# Patient Record
Sex: MALE | Race: WHITE | NOT HISPANIC OR LATINO | URBAN - NONMETROPOLITAN AREA
[De-identification: names, ages, dates, MRNs, and addresses within clinical notes are randomized per-mention and may not be internally consistent; named-entity substitution may affect disease eponyms.]

---

## 2023-12-21 ENCOUNTER — OFFICE VISIT (OUTPATIENT)
Dept: FAMILY MEDICINE CLINIC | Facility: CLINIC | Age: 38
End: 2023-12-21
Payer: MEDICAID

## 2023-12-21 VITALS
OXYGEN SATURATION: 98 % | BODY MASS INDEX: 36.58 KG/M2 | RESPIRATION RATE: 18 BRPM | DIASTOLIC BLOOD PRESSURE: 84 MMHG | HEIGHT: 73 IN | HEART RATE: 78 BPM | SYSTOLIC BLOOD PRESSURE: 122 MMHG | WEIGHT: 276 LBS

## 2023-12-21 DIAGNOSIS — F32.A ANXIETY AND DEPRESSION: ICD-10-CM

## 2023-12-21 DIAGNOSIS — Z76.89 ENCOUNTER TO ESTABLISH CARE: Primary | ICD-10-CM

## 2023-12-21 DIAGNOSIS — Z00.00 ENCOUNTER FOR ANNUAL PHYSICAL EXAM: ICD-10-CM

## 2023-12-21 DIAGNOSIS — F41.9 ANXIETY AND DEPRESSION: ICD-10-CM

## 2023-12-21 DIAGNOSIS — F90.9 ATTENTION DEFICIT HYPERACTIVITY DISORDER (ADHD), UNSPECIFIED ADHD TYPE: ICD-10-CM

## 2023-12-21 PROBLEM — F90.0 ATTENTION DEFICIT HYPERACTIVITY DISORDER (ADHD), PREDOMINANTLY INATTENTIVE TYPE: Status: ACTIVE | Noted: 2023-12-21

## 2023-12-21 LAB
BILIRUB BLD-MCNC: ABNORMAL MG/DL
CLARITY, POC: CLEAR
COLOR UR: YELLOW
GLUCOSE UR STRIP-MCNC: NEGATIVE MG/DL
KETONES UR QL: NEGATIVE
LEUKOCYTE EST, POC: NEGATIVE
NITRITE UR-MCNC: NEGATIVE MG/ML
PH UR: 6 [PH] (ref 5–8)
PROT UR STRIP-MCNC: NEGATIVE MG/DL
RBC # UR STRIP: NEGATIVE /UL
SP GR UR: 1.03 (ref 1–1.03)
UROBILINOGEN UR QL: NORMAL

## 2023-12-21 RX ORDER — DEXTROAMPHETAMINE SACCHARATE, AMPHETAMINE ASPARTATE, DEXTROAMPHETAMINE SULFATE AND AMPHETAMINE SULFATE 5; 5; 5; 5 MG/1; MG/1; MG/1; MG/1
20 TABLET ORAL DAILY
COMMUNITY

## 2023-12-21 RX ORDER — ESCITALOPRAM OXALATE 10 MG/1
10 TABLET ORAL
Qty: 30 TABLET | Refills: 0 | Status: SHIPPED | OUTPATIENT
Start: 2023-12-21 | End: 2024-01-20

## 2023-12-21 NOTE — PROGRESS NOTES
"Chief Complaint  Establish Care (Continuing care of ADHD/Requesting adderall./)    Subjective        Nemesio Mcdonough presents to [facility]  C/O ESTABLISH CARE AS HIS PCP 2. FASTING LABS 3. CHRONIC ILLNESS  Anxiety  Presents for initial visit. Onset was more than 5 years ago. The problem has been gradually worsening. Patient reports no decreased concentration, depressed mood, excessive worry, impotence, insomnia, irritability, nervous/anxious behavior, panic or suicidal ideas. Symptoms occur most days. The severity of symptoms is moderate. The patient sleeps 5 hours per night. The quality of sleep is fair. Nighttime awakenings: one to two.     His past medical history is significant for depression. Past treatments include lifestyle changes and non-SSRI antidepressants. The treatment provided no relief. Compliance with prior treatments has been poor.   Depression  Visit Type: follow-up  Patient is not experiencing: decreased concentration, depressed mood, excessive worry, feelings of hopelessness, impotence, insomnia, irritability, nervousness/anxiety, panic and suicidal ideas.   Severity: moderate   Sleep per night: 5 hours  Sleep quality: fair  Nighttime awakenings: one to two  Compliance with medications:  %      ADHD:  HE WAS DIAGNOSED IN CHILDHOOD/ HE REPORTS HE HAS HAD DIFFICULTY FOCUS   AND HIS JOB IS BEING AFFECTED/ HE SOMETIMES HAS A HARD TIME STAYING ON TASK/   HE STOPPED MEDS 1 YEAR . HE RECENTLY MOVED IN THE AREA.        Objective   Vital Signs:  /84   Pulse 78   Resp 18   Ht 185.4 cm (73\")   Wt 125 kg (276 lb)   SpO2 98%   BMI 36.41 kg/m²   Estimated body mass index is 36.41 kg/m² as calculated from the following:    Height as of this encounter: 185.4 cm (73\").    Weight as of this encounter: 125 kg (276 lb).            Physical Exam  Vitals and nursing note reviewed. Exam conducted with a chaperone present.   Constitutional:       General: He is awake.      " Appearance: Normal appearance. He is well-developed, well-groomed and normal weight. He is obese.   HENT:      Head: Normocephalic and atraumatic.      Jaw: There is normal jaw occlusion.      Right Ear: Hearing, tympanic membrane, ear canal and external ear normal.      Left Ear: Hearing, tympanic membrane, ear canal and external ear normal.      Nose: Nose normal.      Mouth/Throat:      Lips: Pink.      Mouth: Mucous membranes are moist.      Pharynx: Oropharynx is clear.   Eyes:      General: Lids are normal. Vision grossly intact. Gaze aligned appropriately.      Extraocular Movements: Extraocular movements intact.      Conjunctiva/sclera: Conjunctivae normal.      Pupils: Pupils are equal, round, and reactive to light.   Neck:      Trachea: Trachea and phonation normal.   Cardiovascular:      Rate and Rhythm: Normal rate and regular rhythm.      Pulses: Normal pulses.      Heart sounds: Normal heart sounds.   Pulmonary:      Effort: Pulmonary effort is normal.      Breath sounds: Normal breath sounds and air entry.   Abdominal:      General: Abdomen is protuberant. Bowel sounds are normal.      Palpations: Abdomen is soft.   Genitourinary:     Rectum: Normal.   Musculoskeletal:         General: Normal range of motion.      Right shoulder: Normal.      Left shoulder: Normal.      Right upper arm: Normal.      Left upper arm: Normal.      Right elbow: Normal.      Left elbow: Normal.      Right forearm: Normal.      Left forearm: Normal.      Right wrist: Normal.      Left wrist: Normal.      Right hand: Normal.      Left hand: Normal.      Cervical back: Normal, full passive range of motion without pain, normal range of motion and neck supple.      Thoracic back: Normal.      Lumbar back: Normal.      Right hip: Normal.      Left hip: Normal.      Right upper leg: Normal.      Left upper leg: Normal.      Right knee: Normal.      Left knee: Normal.      Right lower leg: Normal. No edema.      Left lower leg:  Normal. No edema.      Right ankle: Normal.      Right Achilles Tendon: Normal.      Left ankle: Normal.      Left Achilles Tendon: Normal.      Right foot: Normal.      Left foot: Normal.   Lymphadenopathy:      Cervical: No cervical adenopathy.   Skin:     General: Skin is warm.      Capillary Refill: Capillary refill takes less than 2 seconds.   Neurological:      General: No focal deficit present.      Mental Status: He is alert and oriented to person, place, and time.      Cranial Nerves: Cranial nerves 2-12 are intact.      Sensory: Sensation is intact.      Motor: Motor function is intact.      Coordination: Coordination is intact.      Gait: Gait is intact.      Deep Tendon Reflexes: Reflexes are normal and symmetric.   Psychiatric:         Attention and Perception: Attention and perception normal.         Mood and Affect: Mood and affect normal.         Speech: Speech normal.         Behavior: Behavior normal. Behavior is cooperative.         Thought Content: Thought content normal.         Cognition and Memory: Cognition and memory normal.         Judgment: Judgment normal.        Result Review :  The following data was reviewed by: Raúl Kelley MD on 12/21/2023:  LABS STILL PENDING          Assessment and Plan   Diagnoses and all orders for this visit:    1. Encounter to establish care (Primary)    2. Encounter for annual physical exam  -     CBC & Differential; Future  -     Comprehensive Metabolic Panel; Future  -     Hemoglobin A1c; Future  -     TSH; Future  -     Lipid Panel; Future  -     Hepatitis C Antibody; Future  -     Vitamin D,25-Hydroxy; Future  -     Vitamin B12; Future  -     POC Urinalysis Dipstick    3. Attention deficit hyperactivity disorder (ADHD), unspecified ADHD type  Comments:  WURS QUESTIONNAIRE DONE  Orders:  -     Ambulatory Referral to Psychiatry  -     CBC & Differential; Future  -     Comprehensive Metabolic Panel; Future  -     Hemoglobin A1c; Future  -     TSH;  Future  -     Lipid Panel; Future  -     Hepatitis C Antibody; Future  -     Vitamin D,25-Hydroxy; Future  -     Vitamin B12; Future  -     POC Urinalysis Dipstick    4. Anxiety and depression  -     Ambulatory Referral to Psychiatry  -     CBC & Differential; Future  -     Comprehensive Metabolic Panel; Future  -     Hemoglobin A1c; Future  -     TSH; Future  -     Lipid Panel; Future  -     Hepatitis C Antibody; Future  -     Vitamin D,25-Hydroxy; Future  -     Vitamin B12; Future  -     POC Urinalysis Dipstick    Other orders  -     escitalopram (Lexapro) 10 MG tablet; Take 1 tablet by mouth every night at bedtime for 30 days.  Dispense: 30 tablet; Refill: 0           I spent 30 minutes caring for Nemesio on this date of service. This time includes time spent by me in the following activities:reviewing tests, obtaining and/or reviewing a separately obtained history, performing a medically appropriate examination and/or evaluation , counseling and educating the patient/family/caregiver, ordering medications, tests, or procedures, and documenting information in the medical record     The preventive exam has been reviewed in detail.  The patient has been fully counseled on preventative guidelines for vaccines, cancer screenings, and other health maintenance needs.   The patient has been counseled on guidelines for maintaining a lifestyle to promote good health and to minimize chronic diseases.  The patient has been assisted with scheduling these healthcare procedures for the coming year and given a written document of health maintenance and anticipatory guidance for age with the AVS.       Follow Up   Return in about 2 weeks (around 1/4/2024) for Recheck-- NEW MED FOR ANXIETY/DEP.  Patient was given instructions and counseling regarding his condition or for health maintenance advice. Please see specific information pulled into the AVS if appropriate.           This document has been electronically signed by  Raúl Kelley MD  December 22, 2023 09:50 EST

## 2024-01-26 ENCOUNTER — OFFICE VISIT (OUTPATIENT)
Dept: FAMILY MEDICINE CLINIC | Facility: CLINIC | Age: 39
End: 2024-01-26
Payer: MEDICAID

## 2024-01-26 VITALS
SYSTOLIC BLOOD PRESSURE: 134 MMHG | HEART RATE: 69 BPM | TEMPERATURE: 98 F | RESPIRATION RATE: 18 BRPM | DIASTOLIC BLOOD PRESSURE: 82 MMHG | OXYGEN SATURATION: 100 % | WEIGHT: 281 LBS | HEIGHT: 73 IN | BODY MASS INDEX: 37.24 KG/M2

## 2024-01-26 DIAGNOSIS — E66.9 OBESITY (BMI 30-39.9): Primary | ICD-10-CM

## 2024-01-26 DIAGNOSIS — F90.0 ATTENTION DEFICIT HYPERACTIVITY DISORDER (ADHD), PREDOMINANTLY INATTENTIVE TYPE: ICD-10-CM

## 2024-01-26 PROCEDURE — 99213 OFFICE O/P EST LOW 20 MIN: CPT | Performed by: NURSE PRACTITIONER

## 2024-01-26 PROCEDURE — 1159F MED LIST DOCD IN RCRD: CPT | Performed by: NURSE PRACTITIONER

## 2024-01-26 PROCEDURE — 1160F RVW MEDS BY RX/DR IN RCRD: CPT | Performed by: NURSE PRACTITIONER

## 2024-01-26 NOTE — PROGRESS NOTES
"Chief Complaint  Follow-up (Follow up on ADHD. Wants to discuss weight-loss options.)    Subjective        Nemesio Mcdonough presents to [facility] for follow up (weight loss).    Other  Chronicity: \"Diagnosed years ago; middle school\" with ADHD. Episode onset: \"years ago\" The problem has been gradually worsening. Pertinent negatives include no abdominal pain, anorexia, arthralgias, change in bowel habit, chest pain, chills, congestion, coughing, diaphoresis, fatigue, fever, headaches, joint swelling, myalgias, nausea, neck pain, numbness, rash, sore throat, swollen glands, urinary symptoms, vertigo, visual change, vomiting or weakness. Treatments tried: Adderall; Was being prescribed by provider in [place]. Improvement on treatment: Out of medication for approximatley a year and a half.   Obesity  This is a chronic problem. The current episode started more than 1 year ago. The problem has been gradually worsening. Pertinent negatives include no abdominal pain, anorexia, arthralgias, change in bowel habit, chest pain, chills, congestion, coughing, diaphoresis, fatigue, fever, headaches, joint swelling, myalgias, nausea, neck pain, numbness, rash, sore throat, swollen glands, urinary symptoms, vertigo, visual change, vomiting or weakness. The symptoms are aggravated by stress. Treatments tried: diet; exercise; calorie restriction.     Objective   Vital Signs:  /82   Pulse 69   Temp 98 °F (36.7 °C) (Temporal)   Resp 18   Ht 185.4 cm (73\")   Wt 127 kg (281 lb)   SpO2 100%   BMI 37.07 kg/m²   Estimated body mass index is 37.07 kg/m² as calculated from the following:    Height as of this encounter: 185.4 cm (73\").    Weight as of this encounter: 127 kg (281 lb).       Physical Exam  Vitals and nursing note reviewed.   Constitutional:       General: He is awake.      Appearance: Normal appearance. He is obese.   HENT:      Head: Normocephalic.      Right Ear: Hearing and external ear " normal.      Left Ear: Hearing and external ear normal.      Nose: Nose normal.      Mouth/Throat:      Lips: Pink.      Mouth: Mucous membranes are moist.   Eyes:      General: Lids are normal.      Conjunctiva/sclera: Conjunctivae normal.      Pupils: Pupils are equal, round, and reactive to light.   Cardiovascular:      Rate and Rhythm: Normal rate.   Pulmonary:      Effort: Pulmonary effort is normal.   Abdominal:      General: Abdomen is protuberant.   Musculoskeletal:         General: Normal range of motion.      Cervical back: Normal range of motion.   Skin:     General: Skin is warm and dry.      Capillary Refill: Capillary refill takes less than 2 seconds.   Neurological:      Mental Status: He is alert and oriented to person, place, and time.      Sensory: Sensation is intact.      Motor: Motor function is intact.      Coordination: Coordination is intact.      Gait: Gait is intact.   Psychiatric:         Attention and Perception: Attention and perception normal.         Mood and Affect: Affect normal. Mood is anxious.         Speech: Speech is rapid and pressured.         Behavior: Behavior is hyperactive. Behavior is cooperative.         Thought Content: Thought content normal.        Result Review :    The following data was reviewed by: MARCELLO Hua on 01/26/2024:              Assessment and Plan     Diagnoses and all orders for this visit:    1. Obesity (BMI 30-39.9) (Primary)  Comments:  pt to continue lifestyle modifications    2. Attention deficit hyperactivity disorder (ADHD), predominantly inattentive type  Comments:  pt to f/u with Dr. Kelley for further eval/treatment; pt refuses necessity of behavioral health referral at this time         I spent 20 minutes caring for Nemesio on this date of service. This time includes time spent by me in the following activities:preparing for the visit, reviewing tests, obtaining and/or reviewing a separately obtained history, performing a medically  appropriate examination and/or evaluation , counseling and educating the patient/family/caregiver, ordering medications, tests, or procedures, referring and communicating with other health care professionals , documenting information in the medical record, independently interpreting results and communicating that information with the patient/family/caregiver, and care coordination    Follow Up     Return in about 4 weeks (around 2/23/2024) for Recheck ADHD with Dr. Kelley.  Patient was given instructions and counseling regarding his condition or for health maintenance advice. Please see specific information pulled into the AVS if appropriate.         This document has been electronically signed by MARCELLO Hua  January 27, 2024 07:14 EST